# Patient Record
(demographics unavailable — no encounter records)

---

## 2025-07-10 NOTE — ASSESSMENT
[FreeTextEntry1] : Nut allergy Start: EPINEPHrine 0.3 MG/0.3ML Injection Solution Auto-injector (EpiPen 2-Сергей); INJECT 0.3ML INTRAMUSCULARLY AS DIRECTED

## 2025-07-10 NOTE — HISTORY OF PRESENT ILLNESS
[de-identified] : Darrell Mcgee came in with his mother to discuss his food allergies and asthma. His mother reported that he is managing well with his asthma and does not require daily medication. He has allergies to walnuts, peanuts, hazelnuts, and pecans. However, he can consume almonds without any issues. His mother wishes for him to be tested for shellfish to broaden his dietary options. Darrell also suffers from seasonal allergies related to trees, cats, and dust mites.